# Patient Record
Sex: FEMALE | Race: WHITE | NOT HISPANIC OR LATINO | URBAN - METROPOLITAN AREA
[De-identification: names, ages, dates, MRNs, and addresses within clinical notes are randomized per-mention and may not be internally consistent; named-entity substitution may affect disease eponyms.]

---

## 2025-04-16 ENCOUNTER — OFFICE VISIT (OUTPATIENT)
Dept: URGENT CARE | Age: 65
End: 2025-04-16
Payer: COMMERCIAL

## 2025-04-16 VITALS
TEMPERATURE: 98.5 F | HEIGHT: 63 IN | OXYGEN SATURATION: 97 % | RESPIRATION RATE: 18 BRPM | WEIGHT: 135 LBS | HEART RATE: 77 BPM | BODY MASS INDEX: 23.92 KG/M2 | SYSTOLIC BLOOD PRESSURE: 123 MMHG | DIASTOLIC BLOOD PRESSURE: 84 MMHG

## 2025-04-16 DIAGNOSIS — R05.1 ACUTE COUGH: Primary | ICD-10-CM

## 2025-04-16 DIAGNOSIS — J06.9 UPPER RESPIRATORY TRACT INFECTION, UNSPECIFIED TYPE: ICD-10-CM

## 2025-04-16 LAB
POC HUMAN RHINOVIRUS PCR: NEGATIVE
POC INFLUENZA A VIRUS PCR: NEGATIVE
POC INFLUENZA B VIRUS PCR: NEGATIVE
POC RESPIRATORY SYNCYTIAL VIRUS PCR: NEGATIVE
POC SARS-COV-2 AG BINAX: ABNORMAL
POC STREPTOCOCCUS PYOGENES (GROUP A STREP) PCR: NEGATIVE

## 2025-04-16 RX ORDER — BENZONATATE 200 MG/1
200 CAPSULE ORAL 3 TIMES DAILY PRN
Qty: 30 CAPSULE | Refills: 0 | Status: SHIPPED | OUTPATIENT
Start: 2025-04-16 | End: 2025-04-26

## 2025-04-16 RX ORDER — METHYLPREDNISOLONE 4 MG/1
TABLET ORAL
Qty: 21 TABLET | Refills: 0 | Status: SHIPPED | OUTPATIENT
Start: 2025-04-16

## 2025-04-16 RX ORDER — AMLODIPINE BESYLATE 2.5 MG/1
2.5 TABLET ORAL DAILY
COMMUNITY

## 2025-04-16 NOTE — PATIENT INSTRUCTIONS
You were provided with a prescription for an antibiotic.  Hold the antibiotic for 2-3 days and only start it if your symptoms worsen or do not improve.       Supportive care - encourage clear fluids ( water, Pedialyte, ) , chicken broth/soup and warm fluids can be soothing as well.  Rest, adjust room temperature and humidity.  Use saline spray/drops as needed.  Tylenol or Motrin if needed for fever.   Follow up with PCP if you are not feeling any better.

## 2025-04-16 NOTE — PROGRESS NOTES
Subjective   Patient ID: Pat Chapman is a 64 y.o. female. They present today with a chief complaint of No chief complaint on file..    History of Present Illness  Patient presents with a complaint of runny nose, congestion, cough and sore throat for a few days.          Past Medical History  Allergies as of 04/16/2025    (Not on File)       Prescriptions Prior to Admission[1]     Medical History[2]    Surgical History[3]         Review of Systems  Review of Systems     See HPI                          Objective    There were no vitals filed for this visit.  No LMP recorded.    Physical Exam  CONSTITUTIONAL: The general appearance and condition of the patient were examined.  Level of distress, nutrition, external development abnormality, and general behavior were noted.  No abnormal findings.  Vital signs as documented.      ENT: External ears: left ear normal ; right ear normal. Bilateral ears have bulging TM's.  Normal external ear exam.  Bilateral swelling and redness to nasal turbinate's.  Erythema with pebbling to throat.         CARDIOVASCULAR: The patient's heart examined for regular rate and rhythm and presence of murmurs. Note taken of any tachycardia, bradycardia or any irregular rhythm.  No abnormal findings.        RESPIRATORY/LUNGS: Chest examined for equal movement, bilaterally.  Lungs examined for equality of breath sounds. Presence or absence of rales noted bilaterally.  Examined for the presence of diffuse or scattered wheezes.  No abnormal findings.      Procedures    Point of Care Test & Imaging Results from this visit  No results found for this visit on 04/16/25.   Imaging  No results found.    Cardiology, Vascular, and Other Imaging  No other imaging results found for the past 2 days      Diagnostic study results (if any) were reviewed by [facility].    Assessment/Plan   Allergies, medications, history, and pertinent labs/EKGs/Imaging reviewed by CELINE Andrade-MARCELL.     Medical  Decision Making  Patient Instructions   You were provided with a prescription for an antibiotic.  Hold the antibiotic for 2-3 days and only start it if your symptoms worsen or do not improve.       Supportive care - encourage clear fluids ( water, Pedialyte, ) , chicken broth/soup and warm fluids can be soothing as well.  Rest, adjust room temperature and humidity.  Use saline spray/drops as needed.  Tylenol or Motrin if needed for fever.   Follow up with PCP if you are not feeling any better.    At time of discharge patient was clinically well-appearing and HDS for outpatient management. The patient and/or family was educated regarding diagnosis, supportive care, OTC and Rx medications. The patient and/or family was given the opportunity to ask questions prior to discharge.  They verbalized understanding of my discussion of the plans for treatment, expected course, indications to return to  or seek further evaluation in ED, and the need for timely follow up as directed.   They were provided with a work/school excuse if requested.    Orders and Diagnoses  There are no diagnoses linked to this encounter.    Medical Admin Record      Patient disposition: Home    Electronically signed by [facility]  1:15 PM           [1] (Not in a hospital admission)  [2]   Past Medical History:  Diagnosis Date    Acute vulvitis 03/17/2017    Vulvitis    Pelvic and perineal pain 01/12/2018    Pelvic pain    Personal history of other diseases of the digestive system     History of femoral hernia   [3]   Past Surgical History:  Procedure Laterality Date    OTHER SURGICAL HISTORY  09/22/2021    Hernia repair    OTHER SURGICAL HISTORY  09/22/2021    Cholecystectomy    OTHER SURGICAL HISTORY  09/22/2021    Femoral hernia repair